# Patient Record
Sex: FEMALE | Race: AMERICAN INDIAN OR ALASKA NATIVE | HISPANIC OR LATINO
[De-identification: names, ages, dates, MRNs, and addresses within clinical notes are randomized per-mention and may not be internally consistent; named-entity substitution may affect disease eponyms.]

---

## 2019-01-24 PROBLEM — Z00.00 ENCOUNTER FOR PREVENTIVE HEALTH EXAMINATION: Status: ACTIVE | Noted: 2019-01-24

## 2019-01-31 ENCOUNTER — APPOINTMENT (OUTPATIENT)
Dept: ULTRASOUND IMAGING | Facility: CLINIC | Age: 64
End: 2019-01-31
Payer: MEDICARE

## 2019-01-31 ENCOUNTER — OUTPATIENT (OUTPATIENT)
Dept: OUTPATIENT SERVICES | Facility: HOSPITAL | Age: 64
LOS: 1 days | End: 2019-01-31

## 2019-01-31 PROCEDURE — 76536 US EXAM OF HEAD AND NECK: CPT | Mod: 26

## 2020-12-29 ENCOUNTER — APPOINTMENT (OUTPATIENT)
Dept: ENDOCRINOLOGY | Facility: CLINIC | Age: 65
End: 2020-12-29
Payer: MEDICARE

## 2020-12-29 VITALS
SYSTOLIC BLOOD PRESSURE: 129 MMHG | HEART RATE: 91 BPM | BODY MASS INDEX: 24.55 KG/M2 | DIASTOLIC BLOOD PRESSURE: 81 MMHG | HEIGHT: 61 IN | WEIGHT: 130 LBS

## 2020-12-29 PROCEDURE — 99072 ADDL SUPL MATRL&STAF TM PHE: CPT

## 2020-12-29 PROCEDURE — 99205 OFFICE O/P NEW HI 60 MIN: CPT

## 2020-12-31 LAB
T3FREE SERPL-MCNC: 3.38 PG/ML
T4 FREE SERPL-MCNC: 1.1 NG/DL
TSH SERPL-ACNC: 0.64 UIU/ML

## 2020-12-31 NOTE — END OF VISIT
[FreeTextEntry3] : All medical record entries made by the Scribe were at my, Dr. Markus Carter, direction and personally dictated by me on 12/29/2020. I have reviewed the chart and agree that the record accurately reflects my personal performance of the history, physical exam, assessment and plan. I have also personally directed, reviewed and agreed with the chart.  [Time Spent: ___ minutes] : I have spent [unfilled] minutes of time on the encounter. [>50% of the face to face encounter time was spent on counseling and/or coordination of care for ___] : Greater than 50% of the face to face encounter time was spent on counseling and/or coordination of care for [unfilled]

## 2020-12-31 NOTE — HISTORY OF PRESENT ILLNESS
[FreeTextEntry1] : 66 y/o F pt, /81, BMI 24.56, with Low TSH (0.34 in 12/2020) and Hx of Thyroid Nodules (dx in 2017), referred by Dr. Bobby Moffett, presents today to establish endocrine care with me.\par Significant PMHx: T2DM (dx ~10 yrs ago)\par Other PMHx: HTN, Gastritis \par Denies Hx of internal/external radiation exposure\par FHx: father (throat CA), sister (Leukemia), brother (throat CA), heart disease (brother), mother (CAD, HTN) \par SHx: no tobacco/etOH use; pt has 5 children with the youngest 20 y/o (none of the children have throat CA)\par Last PCP visit: 12/3/20\par Sees Endocrinologist \par \par Pt was dx with Thyroid Nodules 3 yrs ago and has been followed by endocrinologist Dr. Jackson in [facility] (her last appointment was 9/25/20). Pt reports she did a FNA biopsy done 3-4 yrs ago. Pt notes a thyroid US was done and she was informed everything was normal. \par Pt notes she recently saw her PCP 2/2 weight loss x 3 months. Pt states her PCP wanted her thyroid nodules to be evaluated. \par \par 12/29/20 \par Today pt presents feeling tired with c/o worsening GERD.\par Denies voice changes, swallowing/breathing difficulties, and palpitations.\par \par Current Mediations: Januvia 100mg QD, Metformin 500mg BID, Glipizide, Amlodipine 10mg BID, Lisinopril 2.5mg BID, Omeprazole, Dexilant 60mg BID, \par \par Recent Labs:\par - 12/8/20 A1c 8.2%, TSH 0.34, Free T4 0.91, s. creat 0.7, Ca 10.2, LDL-c 118, urine no protein

## 2020-12-31 NOTE — CONSULT LETTER
[Dear  ___] : Dear  [unfilled], [Consult Letter:] : I had the pleasure of evaluating your patient, [unfilled]. [Sincerely,] : Sincerely, [FreeTextEntry3] : Markus Carter

## 2020-12-31 NOTE — PHYSICAL EXAM
[Alert] : alert [Normal Sclera/Conjunctiva] : normal sclera/conjunctiva [Normal Outer Ear/Nose] : the ears and nose were normal in appearance [Spine Straight] : spine straight [No Stigmata of Cushings Syndrome] : no stigmata of Cushings Syndrome [Normal Gait] : normal gait [No Rash] : no rash [No Tremors] : no tremors [Oriented x3] : oriented to person, place, and time [No Respiratory Distress] : no respiratory distress [Clear to Auscultation] : lungs were clear to auscultation bilaterally [Normal S1, S2] : normal S1 and S2 [Normal Rate] : heart rate was normal [Pedal Pulses Normal] : the pedal pulses are present [Normal Bowel Sounds] : normal bowel sounds [de-identified] : b/l thyroid nodules with regular borders  [de-identified] : no hand tremors

## 2020-12-31 NOTE — ADDENDUM
[FreeTextEntry1] : I, Mai Donahue, acted solely as a scribe for Dr. Markus Carter on this date. 12/29/2020.

## 2020-12-31 NOTE — REVIEW OF SYSTEMS
[Negative] : Psychiatric [As Noted in HPI] : as noted in HPI [Palpitations] : no palpitations [FreeTextEntry4] : denies voice changes, denies swallowing/breathing difficulties

## 2020-12-31 NOTE — ASSESSMENT
[Importance of Diet and Exercise] : importance of diet and exercise to improve glycemic control, achieve weight loss and improve cardiovascular health [Self Monitoring of Blood Glucose] : self monitoring of blood glucose [FreeTextEntry1] : 66 y/o F pt with:\par \par 1. Subclinical Hyperthyroidism with Low TSH on 12/8/20:\par Pt is c/o excessive tiredness and weight loss over the past couple of months, will consider hyperthyroidism and will send for Free T4, Free T3, TSH rab, and TSH. \par \par 2. Hx of Thyroid Nodules (dx in 2017):\par She does not have any symptoms of upper respiratory obstruction nor voice changes. \par Pt has strong FHx of throat CA. Order high resolution thyroid US. \par \par Return in 3 weeks.

## 2020-12-31 NOTE — REASON FOR VISIT
[Initial Evaluation] : an initial evaluation [DM Type 2] : DM Type 2 [Thyroid nodule/ MNG] : thyroid nodule/ MNG [Other___] : [unfilled] [FreeTextEntry2] : Betina Rosales\par Dr. Bobby Moffett

## 2021-01-13 ENCOUNTER — APPOINTMENT (OUTPATIENT)
Dept: ULTRASOUND IMAGING | Facility: CLINIC | Age: 66
End: 2021-01-13
Payer: MEDICARE

## 2021-01-13 ENCOUNTER — OUTPATIENT (OUTPATIENT)
Dept: OUTPATIENT SERVICES | Facility: HOSPITAL | Age: 66
LOS: 1 days | End: 2021-01-13

## 2021-01-13 PROCEDURE — 76536 US EXAM OF HEAD AND NECK: CPT | Mod: 26

## 2021-01-20 ENCOUNTER — APPOINTMENT (OUTPATIENT)
Dept: ENDOCRINOLOGY | Facility: CLINIC | Age: 66
End: 2021-01-20
Payer: MEDICARE

## 2021-01-20 VITALS
HEIGHT: 61 IN | DIASTOLIC BLOOD PRESSURE: 82 MMHG | SYSTOLIC BLOOD PRESSURE: 128 MMHG | HEART RATE: 76 BPM | WEIGHT: 129 LBS | BODY MASS INDEX: 24.35 KG/M2

## 2021-01-20 DIAGNOSIS — E04.2 NONTOXIC MULTINODULAR GOITER: ICD-10-CM

## 2021-01-20 LAB — TSH RECEPTOR AB: <1.1 IU/L

## 2021-01-20 PROCEDURE — 99214 OFFICE O/P EST MOD 30 MIN: CPT

## 2021-01-20 PROCEDURE — 99072 ADDL SUPL MATRL&STAF TM PHE: CPT

## 2021-01-22 PROBLEM — E04.2 MULTIPLE THYROID NODULES: Status: ACTIVE | Noted: 2020-12-29

## 2021-01-22 NOTE — ADDENDUM
[FreeTextEntry1] : I, Enedina Clemente, acted solely as a scribe for Dr. Markus Carter on this date. 01/20/2021.

## 2021-01-22 NOTE — END OF VISIT
[FreeTextEntry3] : All medical record entries made by the Scribe were at my, Dr. Markus Carter, direction and personally dictated by me on 01/20/2021. I have reviewed the chart and agree that the record accurately reflects my personal performance of the history, physical exam, assessment and plan. I have also personally directed, reviewed and agreed with the chart.  [Time Spent: ___ minutes] : I have spent [unfilled] minutes of time on the encounter.

## 2021-01-22 NOTE — ASSESSMENT
[FreeTextEntry1] : 64 y/o F pt with:\par \par 1. Subclinical Hyperthyroidism: \par She is chemically and biochemically euthyroid. \par \par 2. R thyroid nodule with the largest dimension of 2.7 cm: \par Natural hx of thyroid nodules discussed\par After a lengthy conversation with pt, refer pt for FNA biopsy. \par Per pt, she had FNA biopsy done in 2017 which was reported benign.  \par \par Return: after FNA biopsy is completed.

## 2021-01-22 NOTE — REASON FOR VISIT
[Follow - Up] : a follow-up visit [DM Type 2] : DM Type 2 [Thyroid nodule/ MNG] : thyroid nodule/ MNG [Other___] : [unfilled] [FreeTextEntry2] : \par Dr. Bobby Moffett

## 2021-01-22 NOTE — HISTORY OF PRESENT ILLNESS
[FreeTextEntry1] : 64 y/o F pt,with subclinical hyperthyroidism (TSH 0.34 in 12/2020), and Hx of Thyroid Nodules (dx in 2017). Per pt, she had FNA biopsy done in 2017 which was reported benign. \par Significant PMHx:  T2DM (dx ~10 yrs ago)\par Other PMHx: HTN, Gastritis \par Denies Hx of internal/external radiation exposure. \par FHx: father (throat CA), sister (Leukemia), brother (throat CA), heart disease (brother), mother (CAD, HTN).\par Denies FHx of thyroid CA. Denies children with Hx of throat CA.\par SHx: Non smoker. No EtOH use. Pt has 5 children with the youngest child of age 21. \par Last PCP visit: 12/3/20\par \par 12/29/20 \par Pt was dx with Thyroid Nodules 3 yrs ago and has been followed by endocrinologist Dr. Jackson in [facility] (her last appointment was 9/25/20). Pt reports she did a FNA biopsy done 3-4 yrs ago. Pt notes a thyroid US was done and she was informed everything was normal. \par Pt notes she recently saw her PCP 2/2 weight loss x 3 months. Pt states her PCP wanted her thyroid nodules to be evaluated. \par Today pt presents feeling tired with c/o worsening GERD.\par Denies voice changes, swallowing/breathing difficulties, and palpitations.\par \par 01/20/2021\par Pt presents today for thyroid f/u, feeling good with no major physical complaints. She has lost 1 lbs in past 3 weeks. \par Denies swallowing and breathing difficulties. \par \par Current Mediations: Januvia 100mg QD, Metformin 500mg BID, Glipizide, Amlodipine 10mg BID, Lisinopril 2.5mg BID, Omeprazole, Dexilant 60mg BID, \par \par Labs:\par - 1/13/21 Thyroid US: R lobe contains shift. Nodule 1: midportion- inferior pole 2.7 x 1.4 x 1.7 cm solid small cystic components. L lobe contains no visible nodules. Impression: Since 1/31/19, no significant change in a solitary 2.7 cm R-sided solid thyroid nodule. This nodule meets LATHA criteria for FNA. \par - 12/29/20: TSH 0.64, Free T4 1.1, Free T3 3.38, TSH Rab <1.1\par - 12/8/20 A1c 8.2%, s.creat 0.7, LDL-c 118, urine no protein, TSH 0.34, Free T4 0.91, Ca 10.2

## 2021-01-25 ENCOUNTER — LABORATORY RESULT (OUTPATIENT)
Age: 66
End: 2021-01-25

## 2021-01-27 ENCOUNTER — RESULT REVIEW (OUTPATIENT)
Age: 66
End: 2021-01-27

## 2021-01-27 ENCOUNTER — OUTPATIENT (OUTPATIENT)
Dept: OUTPATIENT SERVICES | Facility: HOSPITAL | Age: 66
LOS: 1 days | End: 2021-01-27
Payer: COMMERCIAL

## 2021-01-27 ENCOUNTER — APPOINTMENT (OUTPATIENT)
Dept: ULTRASOUND IMAGING | Facility: HOSPITAL | Age: 66
End: 2021-01-27
Payer: MEDICARE

## 2021-01-27 PROCEDURE — 10005 FNA BX W/US GDN 1ST LES: CPT

## 2021-01-27 PROCEDURE — 88305 TISSUE EXAM BY PATHOLOGIST: CPT | Mod: 26

## 2021-01-27 PROCEDURE — 88305 TISSUE EXAM BY PATHOLOGIST: CPT

## 2021-01-27 PROCEDURE — 88173 CYTOPATH EVAL FNA REPORT: CPT

## 2021-01-27 PROCEDURE — 88173 CYTOPATH EVAL FNA REPORT: CPT | Mod: 26

## 2021-02-23 ENCOUNTER — APPOINTMENT (OUTPATIENT)
Dept: ENDOCRINOLOGY | Facility: CLINIC | Age: 66
End: 2021-02-23

## 2022-07-11 ENCOUNTER — APPOINTMENT (OUTPATIENT)
Dept: ENDOCRINOLOGY | Facility: CLINIC | Age: 67
End: 2022-07-11

## 2022-07-11 VITALS
WEIGHT: 129 LBS | HEART RATE: 98 BPM | BODY MASS INDEX: 24.37 KG/M2 | SYSTOLIC BLOOD PRESSURE: 131 MMHG | DIASTOLIC BLOOD PRESSURE: 78 MMHG

## 2022-07-11 DIAGNOSIS — E05.90 THYROTOXICOSIS, UNSPECIFIED W/OUT THYROTOXIC CRISIS OR STORM: ICD-10-CM

## 2022-07-11 PROCEDURE — 36415 COLL VENOUS BLD VENIPUNCTURE: CPT

## 2022-07-11 PROCEDURE — 99214 OFFICE O/P EST MOD 30 MIN: CPT | Mod: 25

## 2022-07-13 LAB — TSH SERPL-ACNC: 0.53 UIU/ML

## 2022-07-13 RX ORDER — SITAGLIPTIN 100 MG/1
100 TABLET, FILM COATED ORAL
Qty: 90 | Refills: 0 | Status: ACTIVE | COMMUNITY
Start: 2022-06-06

## 2022-07-13 RX ORDER — METFORMIN HYDROCHLORIDE 500 MG/1
500 TABLET, COATED ORAL
Qty: 180 | Refills: 0 | Status: ACTIVE | COMMUNITY
Start: 2022-06-06

## 2022-07-13 RX ORDER — AMLODIPINE BESYLATE 10 MG/1
10 TABLET ORAL
Qty: 90 | Refills: 0 | Status: ACTIVE | COMMUNITY
Start: 2022-06-28

## 2022-07-13 RX ORDER — GLIPIZIDE 10 MG/1
10 TABLET ORAL
Qty: 180 | Refills: 0 | Status: ACTIVE | COMMUNITY
Start: 2022-06-06

## 2022-07-13 RX ORDER — LISINOPRIL 2.5 MG/1
2.5 TABLET ORAL
Qty: 90 | Refills: 0 | Status: ACTIVE | COMMUNITY
Start: 2022-06-06

## 2022-07-13 RX ORDER — ATORVASTATIN CALCIUM 20 MG/1
20 TABLET, FILM COATED ORAL
Qty: 90 | Refills: 0 | Status: ACTIVE | COMMUNITY
Start: 2022-03-08

## 2022-07-15 NOTE — ADDENDUM
[FreeTextEntry1] : I Salvador Jaswant act soley as a scribe for Dr. Markus Carter on this date. 07/11/2022

## 2022-07-15 NOTE — HISTORY OF PRESENT ILLNESS
[FreeTextEntry1] : 65 y/o F pt,with subclinical hyperthyroidism (TSH 0.34 in 12/2020), and Hx of Thyroid Nodules (dx in 2017) which has been followed by endocrinologist Dr. Jackson in [facility] (her last appointment was 9/25/20). Per pt, she had FNA biopsy done in 2017 which was reported benign. \par Significant PMHx: T2DM (dx in 2010)\par Other PMHx: HTN, Gastritis-GERD. Denies Hx of internal/external radiation exposure. \par FHx: father (throat CA), sister (Leukemia), brother (throat CA), heart disease (brother), mother (CAD, HTN). Denies FHx of thyroid CA. Denies children with Hx of throat CA.\par SHx: Non smoker. No EtOH use. Pt has 5 children with the youngest child of age 21. \par Last PCP visit: 12/3/20\par \par 01/20/2021\par Pt presents today for thyroid f/u, feeling good with no major physical complaints. She has lost 1 lbs in past 3 weeks. \par Denies swallowing and breathing difficulties. \par \par 07/11/2022\par Today, pt feels well, with no physical complaints. Her DM is being managed by "Dr. Moffett" and she is doing well. Pt checks her BS and her glucose readings are in target.  \par Denies palpitations, weight loss, GI disturbances, chocking while lying down, breathing difficulties, dysphagia, and dysphonia. \par \par Current Mediations: Januvia 100 mg QD, Metformin 500 mg BID, Glipizide, Amlodipine 10 mg BID, Lisinopril 2.5 mg BID, Omeprazole, Dexilant 60 mg BID\par

## 2022-07-15 NOTE — REVIEW OF SYSTEMS
[Negative] : Heme/Lymph [Recent Weight Loss (___ Lbs)] : no recent weight loss [Dysphagia] : no dysphagia [Dysphonia] : no dysphonia [Palpitations] : no palpitations [Difficulty Breathing] : no dyspnea [Nausea] : no nausea [Vomiting] : no vomiting [Diarrhea] : no diarrhea [FreeTextEntry4] : Denies chocking while lying down

## 2022-07-15 NOTE — END OF VISIT
[FreeTextEntry3] : All medical record entries made by the Scribe were at my, Dr. Markus Carter, direction and personally dictated by me on 07/11/2022. I have reviewed the chart and agree that the record accurately reflects my personal performance of the history, physical exam, assessment and plan. I have also personally directed, reviewed and agreed with the chart.  [Time Spent: ___ minutes] : I have spent [unfilled] minutes of time on the encounter.

## 2022-07-15 NOTE — PHYSICAL EXAM
[Alert] : alert [Normal Sclera/Conjunctiva] : normal sclera/conjunctiva [Normal Outer Ear/Nose] : the ears and nose were normal in appearance [No Respiratory Distress] : no respiratory distress [Clear to Auscultation] : lungs were clear to auscultation bilaterally [Normal S1, S2] : normal S1 and S2 [Normal Rate] : heart rate was normal [Pedal Pulses Normal] : the pedal pulses are present [Normal Bowel Sounds] : normal bowel sounds [Spine Straight] : spine straight [No Stigmata of Cushings Syndrome] : no stigmata of Cushings Syndrome [Normal Gait] : normal gait [No Rash] : no rash [No Tremors] : no tremors [Oriented x3] : oriented to person, place, and time [Acanthosis Nigricans] : no acanthosis nigricans [de-identified] : R thyroid nodule palpated.  [de-identified] : no hand tremors

## 2022-07-15 NOTE — DATA REVIEWED
[FreeTextEntry1] : Labs:\par - 12/29/20: TSH 0.64, Free T4 1.1, Free T3 3.38, TSH Rab <1.1\par - 12/8/20 A1c 8.2%, s.creat 0.7, LDL-c 118, urine no protein, TSH 0.34, Free T4 0.91, Ca 10.2\par \par Imaging:\par - 1/13/21 Thyroid US: R lobe contains shift. Nodule 1: midportion- inferior pole 2.7 x 1.4 x 1.7 cm solid small cystic components. L lobe contains no visible nodules. Impression: Since 1/31/19, no significant change in a solitary 2.7 cm R-sided solid thyroid nodule. This nodule meets LATHA criteria for FNA.

## 2022-07-15 NOTE — ASSESSMENT
[FreeTextEntry1] : 65 y/o F pt with:\par \par 1. Subclinical Hyperthyroidism (TSH 0.34 on 12/08/20): \par Pt is complaining of hand tremors. Send TFTs today and contact pt with report.\par \par 2. R thyroid nodule measuring 2.7 cm (01/2021 Thyroid US): \par Pt had an FNA biopsy on 01/27/21, reported as [place] II. \par Natural history of thyroid nodules discussed. \par Order Thyroid US. Will contact pt with report. \par \par Return in: 1 year.

## 2022-07-21 ENCOUNTER — OUTPATIENT (OUTPATIENT)
Dept: OUTPATIENT SERVICES | Facility: HOSPITAL | Age: 67
LOS: 1 days | End: 2022-07-21
Payer: COMMERCIAL

## 2022-07-21 ENCOUNTER — APPOINTMENT (OUTPATIENT)
Age: 67
End: 2022-07-21

## 2022-07-21 PROCEDURE — 76536 US EXAM OF HEAD AND NECK: CPT | Mod: 26

## 2022-07-21 PROCEDURE — 76536 US EXAM OF HEAD AND NECK: CPT

## 2022-08-19 ENCOUNTER — NON-APPOINTMENT (OUTPATIENT)
Age: 67
End: 2022-08-19

## 2023-01-03 ENCOUNTER — APPOINTMENT (OUTPATIENT)
Dept: ENDOCRINOLOGY | Facility: CLINIC | Age: 68
End: 2023-01-03

## 2023-09-26 ENCOUNTER — APPOINTMENT (OUTPATIENT)
Dept: ENDOCRINOLOGY | Facility: CLINIC | Age: 68
End: 2023-09-26
Payer: MEDICARE

## 2023-09-26 VITALS
WEIGHT: 130 LBS | HEART RATE: 93 BPM | SYSTOLIC BLOOD PRESSURE: 142 MMHG | HEIGHT: 61 IN | DIASTOLIC BLOOD PRESSURE: 79 MMHG | BODY MASS INDEX: 24.55 KG/M2

## 2023-09-26 DIAGNOSIS — E04.1 NONTOXIC SINGLE THYROID NODULE: ICD-10-CM

## 2023-09-26 PROCEDURE — 99213 OFFICE O/P EST LOW 20 MIN: CPT | Mod: 25

## 2023-09-29 LAB — TSH SERPL-ACNC: 0.57 UIU/ML

## 2023-10-05 ENCOUNTER — APPOINTMENT (OUTPATIENT)
Dept: ULTRASOUND IMAGING | Facility: HOSPITAL | Age: 68
End: 2023-10-05
Payer: MEDICARE

## 2023-10-05 ENCOUNTER — OUTPATIENT (OUTPATIENT)
Dept: OUTPATIENT SERVICES | Facility: HOSPITAL | Age: 68
LOS: 1 days | End: 2023-10-05
Payer: MEDICARE

## 2023-10-05 PROCEDURE — 76536 US EXAM OF HEAD AND NECK: CPT

## 2023-10-05 PROCEDURE — 76536 US EXAM OF HEAD AND NECK: CPT | Mod: 26

## 2023-10-21 ENCOUNTER — NON-APPOINTMENT (OUTPATIENT)
Age: 68
End: 2023-10-21

## 2025-01-28 ENCOUNTER — APPOINTMENT (OUTPATIENT)
Dept: ENDOCRINOLOGY | Facility: CLINIC | Age: 70
End: 2025-01-28

## 2025-01-28 VITALS
WEIGHT: 127 LBS | SYSTOLIC BLOOD PRESSURE: 119 MMHG | HEART RATE: 80 BPM | BODY MASS INDEX: 24 KG/M2 | DIASTOLIC BLOOD PRESSURE: 72 MMHG

## 2025-01-28 DIAGNOSIS — E04.1 NONTOXIC SINGLE THYROID NODULE: ICD-10-CM

## 2025-01-28 DIAGNOSIS — E04.2 NONTOXIC MULTINODULAR GOITER: ICD-10-CM

## 2025-01-28 DIAGNOSIS — E05.90 THYROTOXICOSIS, UNSPECIFIED W/OUT THYROTOXIC CRISIS OR STORM: ICD-10-CM

## 2025-01-28 PROCEDURE — 99214 OFFICE O/P EST MOD 30 MIN: CPT | Mod: 25

## 2025-02-04 LAB
T4 FREE SERPL-MCNC: 1.3 NG/DL
THYROGLOB AB SERPL-ACNC: 19.1 IU/ML
THYROPEROXIDASE AB SERPL IA-ACNC: 12.2 IU/ML
TSH SERPL-ACNC: 0.72 UIU/ML

## 2025-02-10 ENCOUNTER — APPOINTMENT (OUTPATIENT)
Dept: ULTRASOUND IMAGING | Facility: CLINIC | Age: 70
End: 2025-02-10
Payer: MEDICARE

## 2025-02-10 PROCEDURE — 76536 US EXAM OF HEAD AND NECK: CPT
